# Patient Record
Sex: FEMALE | Race: WHITE | NOT HISPANIC OR LATINO | Employment: UNEMPLOYED | URBAN - METROPOLITAN AREA
[De-identification: names, ages, dates, MRNs, and addresses within clinical notes are randomized per-mention and may not be internally consistent; named-entity substitution may affect disease eponyms.]

---

## 2022-10-30 ENCOUNTER — HOSPITAL ENCOUNTER (EMERGENCY)
Facility: HOSPITAL | Age: 37
Discharge: HOME | End: 2022-10-30
Attending: EMERGENCY MEDICINE

## 2022-10-30 VITALS
DIASTOLIC BLOOD PRESSURE: 77 MMHG | RESPIRATION RATE: 18 BRPM | HEART RATE: 57 BPM | WEIGHT: 192 LBS | BODY MASS INDEX: 30.99 KG/M2 | TEMPERATURE: 97.9 F | SYSTOLIC BLOOD PRESSURE: 144 MMHG | OXYGEN SATURATION: 99 %

## 2022-10-30 DIAGNOSIS — R11.2 NAUSEA AND VOMITING: Primary | ICD-10-CM

## 2022-10-30 LAB
BACTERIA UR QL AUTO: ABNORMAL /HPF
BILIRUB UR QL STRIP: NEGATIVE
CLARITY UR: ABNORMAL
COLOR UR: YELLOW
EXT PREG TEST URINE: NEGATIVE
EXT. CONTROL ED NAV: NORMAL
GLUCOSE UR STRIP-MCNC: NEGATIVE MG/DL
HGB UR QL STRIP.AUTO: NEGATIVE
KETONES UR STRIP-MCNC: ABNORMAL MG/DL
LEUKOCYTE ESTERASE UR QL STRIP: NEGATIVE
MUCOUS THREADS UR QL AUTO: ABNORMAL
NITRITE UR QL STRIP: NEGATIVE
NON-SQ EPI CELLS URNS QL MICRO: ABNORMAL /HPF
PH UR STRIP.AUTO: 8 [PH]
PROT UR STRIP-MCNC: ABNORMAL MG/DL
RBC #/AREA URNS AUTO: ABNORMAL /HPF
SP GR UR STRIP.AUTO: 1.01 (ref 1–1.03)
UROBILINOGEN UR STRIP-ACNC: <2 MG/DL
WBC #/AREA URNS AUTO: ABNORMAL /HPF

## 2022-10-30 RX ORDER — ONDANSETRON 4 MG/1
4 TABLET, ORALLY DISINTEGRATING ORAL ONCE
Status: COMPLETED | OUTPATIENT
Start: 2022-10-30 | End: 2022-10-30

## 2022-10-30 RX ORDER — METOCLOPRAMIDE HYDROCHLORIDE 5 MG/ML
10 INJECTION INTRAMUSCULAR; INTRAVENOUS ONCE
Status: COMPLETED | OUTPATIENT
Start: 2022-10-30 | End: 2022-10-30

## 2022-10-30 RX ORDER — ONDANSETRON 2 MG/ML
4 INJECTION INTRAMUSCULAR; INTRAVENOUS ONCE
Status: COMPLETED | OUTPATIENT
Start: 2022-10-30 | End: 2022-10-30

## 2022-10-30 RX ADMIN — METOCLOPRAMIDE 10 MG: 5 INJECTION, SOLUTION INTRAMUSCULAR; INTRAVENOUS at 19:45

## 2022-10-30 RX ADMIN — SODIUM CHLORIDE 1000 ML: 0.9 INJECTION, SOLUTION INTRAVENOUS at 19:45

## 2022-10-30 RX ADMIN — ONDANSETRON 4 MG: 4 TABLET, ORALLY DISINTEGRATING ORAL at 16:42

## 2022-10-30 RX ADMIN — ONDANSETRON 4 MG: 2 INJECTION INTRAMUSCULAR; INTRAVENOUS at 22:35

## 2022-10-30 RX ADMIN — SODIUM CHLORIDE 1000 ML: 0.9 INJECTION, SOLUTION INTRAVENOUS at 21:25

## 2022-10-30 NOTE — ED PROVIDER NOTES
History  Chief Complaint   Patient presents with   • Vomiting     Vomiting and nausea since 0530 this morning after drinking last night  This is a 38 y/o female with PMH depression presents to the ER for generalized abdominal discomfort, nausea, vomiting since this morning  Patient admits to last night drinking a lot and having beer, mixed drinks and shots  She states since this morning she has been very nauseous and vomited "too many times to count " she admits to generalized abdominal pain as well associated with the nausea/vomiting  She denies any changes in her urination, fevers, chest pain, shortness of breath, headaches, bodyaches, URI symptoms  She denies sick contacts, recent travel  Admits to using medical marijuana  History provided by:  Patient   used: No    Vomiting  Severity:  Moderate  Duration:  1 day  Timing:  Intermittent  Quality:  Stomach contents  Progression:  Worsening  Chronicity:  New  Recent urination:  Decreased  Context: self-induced    Associated symptoms: abdominal pain    Associated symptoms: no arthralgias, no chills, no diarrhea, no fever, no headaches, no myalgias, no sore throat and no URI        Prior to Admission Medications   Prescriptions Last Dose Informant Patient Reported? Taking?   predniSONE 5 mg tablet   Yes No   Sig: Take 5 mg by mouth daily  sertraline (ZOLOFT) 50 mg tablet   Yes No   Sig: Take 50 mg by mouth daily Indications: Major Depressive Disorder  Facility-Administered Medications: None       Past Medical History:   Diagnosis Date   • Depression        History reviewed  No pertinent surgical history  History reviewed  No pertinent family history  I have reviewed and agree with the history as documented      E-Cigarette/Vaping   • E-Cigarette Use Never User      E-Cigarette/Vaping Substances     Social History     Tobacco Use   • Smoking status: Never Smoker   • Smokeless tobacco: Never Used   Vaping Use   • Vaping Use: Never used   Substance Use Topics   • Alcohol use: Yes     Comment: socially   • Drug use: Never       Review of Systems   Constitutional: Negative for chills and fever  HENT: Negative for congestion, rhinorrhea and sore throat  Respiratory: Negative for chest tightness and shortness of breath  Cardiovascular: Negative for chest pain  Gastrointestinal: Positive for abdominal pain and vomiting  Negative for blood in stool, diarrhea and nausea  Genitourinary: Positive for decreased urine volume  Negative for difficulty urinating and dysuria  Musculoskeletal: Negative for arthralgias and myalgias  Skin: Negative for color change  Neurological: Negative for headaches  Psychiatric/Behavioral: Negative for behavioral problems and sleep disturbance  All other systems reviewed and are negative  Physical Exam  Physical Exam  Vitals and nursing note reviewed  Constitutional:       General: She is awake  Appearance: Normal appearance  She is well-developed  HENT:      Head: Normocephalic and atraumatic  Right Ear: External ear normal       Left Ear: External ear normal       Nose: Nose normal       Mouth/Throat:      Mouth: Mucous membranes are moist       Pharynx: Oropharynx is clear  No oropharyngeal exudate or posterior oropharyngeal erythema  Eyes:      General: No scleral icterus  Extraocular Movements: Extraocular movements intact  Conjunctiva/sclera: Conjunctivae normal       Pupils: Pupils are equal, round, and reactive to light  Cardiovascular:      Rate and Rhythm: Normal rate and regular rhythm  Heart sounds: Normal heart sounds, S1 normal and S2 normal  No murmur heard  No gallop  Pulmonary:      Effort: Pulmonary effort is normal       Breath sounds: Normal breath sounds  No wheezing, rhonchi or rales  Abdominal:      General: Abdomen is protuberant  Bowel sounds are normal       Palpations: Abdomen is soft  Tenderness:  There is no abdominal tenderness  There is no right CVA tenderness, left CVA tenderness, guarding or rebound  Musculoskeletal:         General: Normal range of motion  Cervical back: Normal range of motion  Skin:     General: Skin is warm and dry  Neurological:      General: No focal deficit present  Mental Status: She is alert  Psychiatric:         Attention and Perception: Attention and perception normal          Mood and Affect: Mood normal          Behavior: Behavior normal  Behavior is cooperative           Vital Signs  ED Triage Vitals [10/30/22 1636]   Temperature Pulse Respirations Blood Pressure SpO2   97 9 °F (36 6 °C) 65 20 132/76 99 %      Temp Source Heart Rate Source Patient Position - Orthostatic VS BP Location FiO2 (%)   Temporal Monitor Sitting Right arm --      Pain Score       No Pain           Vitals:    10/30/22 2125 10/30/22 2130 10/30/22 2230 10/30/22 2300   BP: 96/57 96/57 145/84 144/77   Pulse: 72 55 75 57   Patient Position - Orthostatic VS:             Visual Acuity      ED Medications  Medications   ondansetron (ZOFRAN-ODT) dispersible tablet 4 mg (4 mg Oral Given 10/30/22 1642)   metoclopramide (REGLAN) injection 10 mg (10 mg Intravenous Given 10/30/22 1945)   sodium chloride 0 9 % bolus 1,000 mL (0 mL Intravenous Stopped 10/30/22 2045)   sodium chloride 0 9 % bolus 1,000 mL (0 mL Intravenous Stopped 10/30/22 2249)   ondansetron (ZOFRAN) injection 4 mg (4 mg Intravenous Given 10/30/22 2235)       Diagnostic Studies  Results Reviewed     Procedure Component Value Units Date/Time    Urine Microscopic [[id number]]  (Abnormal) Collected: 10/30/22 1916    Lab Status: Final result Specimen: Urine, Clean Catch Updated: 10/30/22 1944     RBC, UA 1-2 /hpf      WBC, UA 1-2 /hpf      Epithelial Cells Moderate /hpf      Bacteria, UA Occasional /hpf      MUCUS THREADS Moderate    UA (URINE) with reflex to Scope [[id number]]  (Abnormal) Collected: 10/30/22 1916    Lab Status: Final result Specimen: Urine, Clean Catch Updated: 10/30/22 1919     Color, UA Yellow     Clarity, UA Hazy     Specific [place], UA 1 015     pH, UA 8 0     Leukocytes, UA Negative     Nitrite, UA Negative     Protein, UA 30 (1+) mg/dl      Glucose, UA Negative mg/dl      Ketones,  (4+) mg/dl      Urobilinogen, UA <2 0 mg/dl      Bilirubin, UA Negative     Occult Blood, UA Negative    POCT pregnancy, urine [[id number]]  (Normal) Resulted: 10/30/22 1916    Lab Status: Final result Updated: 10/30/22 1916     EXT PREG TEST UR (Ref: Negative) negative     Control valid                 No orders to display              Procedures  Procedures         ED Course  ED Course as of 10/30/22 2348   Sun Oct 30, 2022   1826 PO challenging after zofran   2128 Patient vomited after zofran  Gave reglan and 1 L of fluids  Patient BP 97/57 after so will add another bolus   2245 Patient still vomiting  Will give IV zofran then PO challenge  BP improved   2333 Patient eating cheerios, pretzels, drinking water  Stable for discharge at this time                   MDM  Number of Diagnoses or Management Options  Nausea and vomiting: new and requires workup  Diagnosis management comments: This is a 38 y/o female here for nausea/vomiting and generalized abdominal discomfort all day today after drinking too much last night  Differential diagnosis: dehydration  Plan: patient's urine was analyzed and did show signed of dehydration  See ED course  Patient felt much better after 2 L NS and anti-nausea medications  Patient  was given strict return to ER precautions both verbally and in discharge papers  Patient verbalized understanding and agrees with plan          Amount and/or Complexity of Data Reviewed  Clinical lab tests: ordered and reviewed    Risk of Complications, Morbidity, and/or Mortality  Presenting problems: minimal  Diagnostic procedures: minimal  Management options: minimal    Patient Progress  Patient progress: improved      Disposition  Final diagnoses: Nausea and vomiting     Time reflects when diagnosis was documented in both MDM as applicable and the Disposition within this note     Time User Action Codes Description Comment    10/30/2022 11:35 PM Lonny Serna Add [R11 2] Nausea and vomiting       ED Disposition     ED Disposition   Discharge    Condition   Stable    Date/Time   Sun Oct 30, 2022 11:35 PM    Comment   Dayron Leventhal discharge to home/self care  Follow-up Information     Follow up With Specialties Details Why Contact Info Additional Information    Miguel Angel Awkward, DO  Call  As needed [address] Emergency Department Emergency Medicine Go to  if you develop abdominal pain that localizes to the right or left lower quadrants, abdomen becomes hard or rigid, blood in vomit or stool, chest pain, shortness of breath, palpitations, feel dizzy, faint, mouth/lips dry, confused, cant be woken [id number] [facility] Emergency Department, [address] [place] [address] Nicki Mathis Luige Denny [address]          Patient's Medications   Discharge Prescriptions    No medications on file       No discharge procedures on file      PDMP Review     None          ED Provider  Electronically Signed by           Cony Valentine PA-C  10/30/22 5312

## 2022-10-31 NOTE — DISCHARGE INSTRUCTIONS
Make sure you are staying well hydrated with water, gatorade, electrolyte drinks  Eat BRAT diet, bland foods, rice, apple sauce, toast  Schedule an appointment with your PCP if your symptoms persist  Return to the ER if you develop abdominal pain that localizes to the right or left lower quadrants, abdomen becomes hard or rigid, blood in vomit or stool, chest pain, shortness of breath, palpitations, feel lightheaded, dizzy, faint, mouth/lips dry, confused, cant be woken